# Patient Record
Sex: MALE | Race: WHITE | NOT HISPANIC OR LATINO | Employment: OTHER | URBAN - METROPOLITAN AREA
[De-identification: names, ages, dates, MRNs, and addresses within clinical notes are randomized per-mention and may not be internally consistent; named-entity substitution may affect disease eponyms.]

---

## 2023-12-20 ENCOUNTER — TELEPHONE (OUTPATIENT)
Dept: PRIMARY CARE | Facility: CLINIC | Age: 68
End: 2023-12-20

## 2023-12-20 DIAGNOSIS — M54.9 BACK PAIN, UNSPECIFIED BACK LOCATION, UNSPECIFIED BACK PAIN LATERALITY, UNSPECIFIED CHRONICITY: Primary | ICD-10-CM

## 2023-12-20 NOTE — TELEPHONE ENCOUNTER
Patient called and is requesting 2 handicap placards.  He stated both he and his wife are having significant health problems at this time.  Please advise when complete.  Thank you.

## 2024-01-08 PROBLEM — I10 HTN (HYPERTENSION): Status: ACTIVE | Noted: 2024-01-08

## 2024-01-08 PROBLEM — C67.9 BLADDER CANCER (MULTI): Status: ACTIVE | Noted: 2024-01-08

## 2024-01-08 PROBLEM — E78.5 HLD (HYPERLIPIDEMIA): Status: ACTIVE | Noted: 2024-01-08

## 2024-01-08 PROBLEM — I71.40 AAA (ABDOMINAL AORTIC ANEURYSM) (CMS-HCC): Status: ACTIVE | Noted: 2024-01-08

## 2024-01-08 PROBLEM — C34.90 LUNG CANCER (MULTI): Status: ACTIVE | Noted: 2024-01-08

## 2024-01-10 ENCOUNTER — OFFICE VISIT (OUTPATIENT)
Dept: SURGERY | Facility: HOSPITAL | Age: 69
End: 2024-01-10
Payer: COMMERCIAL

## 2024-01-10 VITALS
TEMPERATURE: 97.2 F | HEART RATE: 77 BPM | WEIGHT: 258.7 LBS | RESPIRATION RATE: 18 BRPM | HEIGHT: 70 IN | OXYGEN SATURATION: 97 % | SYSTOLIC BLOOD PRESSURE: 127 MMHG | BODY MASS INDEX: 37.03 KG/M2 | DIASTOLIC BLOOD PRESSURE: 72 MMHG

## 2024-01-10 DIAGNOSIS — R59.0 MEDIASTINAL ADENOPATHY: ICD-10-CM

## 2024-01-10 DIAGNOSIS — R91.8 LUNG MASS: ICD-10-CM

## 2024-01-10 PROCEDURE — 99205 OFFICE O/P NEW HI 60 MIN: CPT | Performed by: THORACIC SURGERY (CARDIOTHORACIC VASCULAR SURGERY)

## 2024-01-10 PROCEDURE — 99215 OFFICE O/P EST HI 40 MIN: CPT | Performed by: THORACIC SURGERY (CARDIOTHORACIC VASCULAR SURGERY)

## 2024-01-10 PROCEDURE — 1159F MED LIST DOCD IN RCRD: CPT | Performed by: THORACIC SURGERY (CARDIOTHORACIC VASCULAR SURGERY)

## 2024-01-10 PROCEDURE — 3078F DIAST BP <80 MM HG: CPT | Performed by: THORACIC SURGERY (CARDIOTHORACIC VASCULAR SURGERY)

## 2024-01-10 PROCEDURE — 3074F SYST BP LT 130 MM HG: CPT | Performed by: THORACIC SURGERY (CARDIOTHORACIC VASCULAR SURGERY)

## 2024-01-10 PROCEDURE — 1157F ADVNC CARE PLAN IN RCRD: CPT | Performed by: THORACIC SURGERY (CARDIOTHORACIC VASCULAR SURGERY)

## 2024-01-10 PROCEDURE — 1126F AMNT PAIN NOTED NONE PRSNT: CPT | Performed by: THORACIC SURGERY (CARDIOTHORACIC VASCULAR SURGERY)

## 2024-01-10 RX ORDER — TELMISARTAN 40 MG/1
40 TABLET ORAL DAILY
COMMUNITY

## 2024-01-10 RX ORDER — CARVEDILOL 3.12 MG/1
TABLET ORAL
COMMUNITY

## 2024-01-10 RX ORDER — ROSUVASTATIN CALCIUM 20 MG/1
20 TABLET, COATED ORAL DAILY
COMMUNITY

## 2024-01-10 RX ORDER — LEVOTHYROXINE SODIUM 175 UG/1
175 TABLET ORAL
COMMUNITY

## 2024-01-10 RX ORDER — EZETIMIBE 10 MG/1
10 TABLET ORAL DAILY
COMMUNITY

## 2024-01-10 ASSESSMENT — ENCOUNTER SYMPTOMS
DEPRESSION: 0
LOSS OF SENSATION IN FEET: 0
OCCASIONAL FEELINGS OF UNSTEADINESS: 0

## 2024-01-10 ASSESSMENT — PAIN SCALES - GENERAL: PAINLEVEL: 0-NO PAIN

## 2024-01-10 ASSESSMENT — PATIENT HEALTH QUESTIONNAIRE - PHQ9
2. FEELING DOWN, DEPRESSED OR HOPELESS: NOT AT ALL
SUM OF ALL RESPONSES TO PHQ9 QUESTIONS 1 AND 2: 0
1. LITTLE INTEREST OR PLEASURE IN DOING THINGS: NOT AT ALL

## 2024-01-10 ASSESSMENT — COLUMBIA-SUICIDE SEVERITY RATING SCALE - C-SSRS
6. HAVE YOU EVER DONE ANYTHING, STARTED TO DO ANYTHING, OR PREPARED TO DO ANYTHING TO END YOUR LIFE?: NO
2. HAVE YOU ACTUALLY HAD ANY THOUGHTS OF KILLING YOURSELF?: NO
1. IN THE PAST MONTH, HAVE YOU WISHED YOU WERE DEAD OR WISHED YOU COULD GO TO SLEEP AND NOT WAKE UP?: NO

## 2024-01-10 NOTE — PROGRESS NOTES
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  He is a 67-year-old male with history of recurrent bladder cancers, abdominal aortic aneurysm repair, left upper lobectomy in April 2020 for an adenocarcinoma of the lung, who was found to have a 2.3 cm tumor in the lower lobe in the right hilum.  Bronchoscopy was done which showed non-small cell cancer with mediastinal nodes negative.  A PET scan shows no remote disease which was a question of a tiny nodule in the right upper lobe.  He has no symptoms related to this nodule of note he is very active and denies any chest pain with exertion and does have some dyspnea on exertion.    No past medical history on file.       Current Outpatient Medications:     carvedilol (Coreg) 3.125 mg tablet, Take by mouth 2 times a day with meals., Disp: , Rfl:     ezetimibe (Zetia) 10 mg tablet, Take 1 tablet (10 mg) by mouth once daily., Disp: , Rfl:     levothyroxine (Synthroid, Levoxyl) 175 mcg tablet, Take 1 tablet (175 mcg) by mouth once daily in the morning. Take before meals., Disp: , Rfl:     rosuvastatin (Crestor) 20 mg tablet, Take 1 tablet (20 mg) by mouth once daily., Disp: , Rfl:     telmisartan (MIcarDIS) 40 mg tablet, Take 1 tablet (40 mg) by mouth once daily., Disp: , Rfl:     PSHx:  SHx:  ex smoker quit in 2020 with a 80 pack year history, never smoker, denies ETOH, or illicit drugs   FMHx: negative for history of thoracic cancer     ROS  General: negative for fever, chills, weight loss, night sweat  Head: negative for severe headache, vision change, blurred vision,   CV: negative for chest pain, dizziness, lightheadedness   Pulm: negative for shortness of breath, dyspnea on exertion, hemoptysis  GI: negative for diarrhea, constipation, abdominal pain, nausea or vomiting, BRBPR  : negative for dysuria, hematuria, incontinence  Skin: negative for rash  Heme: negative for blood thinner, bleeding  disorder or clotting disorder  Endo: negative for heat or cold intolerance, weight gain or weight loss  MSK: negative for rash, edema, weakness    PHYSICAL EXAM  Constitution: well-developed well-nourished female lying in bed in no acute distress  HEENT: NCAT, moist mucosal membrane, neck supple, no crepitus, sclera anicteric  Lymph nodes: no cervical or supraclavicular lymphadenopathy  Cardiac: RRR, normal S1, S2, no mrg  Pulmonary: normal air movement, CTAP, no wcr  Abdomen: soft, non-distended, non-tender, no rigidity, guarding or rebound tenderness, no splenohepatomegaly  Neuro: AOx3, CNII-XII grossly normal  Ext: warm, dry, no edema noted  Skin: dry, clean and intact  Psych: mood and affect wnl    Results    I reviewed the PFT which show a diffusion capacity and FEV1 both around 35% predicted.  I looked at the PET scan from 12/11/2023 that shows right hilar activity and a tiny amount of right upper lobe activity.  I viewed his pathology results.    Assessment and Plan:    This is a 67-year-old male with multiple medical problems who had a prior left upper lobectomy.  The tumor in the right lower lobe appears to be in the medial segment but is fairly central.  In the operation the right side which would be quite complex as his left lung was too small in all likelihood to support single lung ventilation.  I explained that any operation would most likely because of this require a thoracotomy.  I explained that even if intention is to perform a single segmentectomy a large amount of right lung may need to be required during the operation which could make his breathing postoperatively very difficult.  We await the results of the echo that he had as well as a walk test and I would need to look at his CAT scan with IV contrast in more detail prior to making a definitive decisions regarding the feasibility of surgical resection  He is also going to see a radiation oncologist for consideration of stereotactic radiation  which I believe would be a good alternative.  We will talk again after I obtain all these test and after he meets with the radiation oncologist.        Avel Akins MD  Chief Division of Thoracic and Esophageal Surgery    [facility]   Co-Director, Thoracic Oncology Program    [facility]  Professor of Surgery    [facility] [employer] [employer]  Office phone: [phone]  Fax: [phone]

## 2024-01-11 ENCOUNTER — TELEPHONE (OUTPATIENT)
Dept: OPERATING ROOM | Facility: HOSPITAL | Age: 69
End: 2024-01-11